# Patient Record
Sex: FEMALE | Race: WHITE | Employment: OTHER | URBAN - METROPOLITAN AREA
[De-identification: names, ages, dates, MRNs, and addresses within clinical notes are randomized per-mention and may not be internally consistent; named-entity substitution may affect disease eponyms.]

---

## 2020-09-16 ENCOUNTER — HOSPITAL ENCOUNTER (OUTPATIENT)
Dept: MAMMOGRAPHY | Age: 65
Discharge: HOME | End: 2020-09-16
Attending: GENERAL PRACTICE
Payer: COMMERCIAL

## 2020-09-16 DIAGNOSIS — Z12.31 VISIT FOR SCREENING MAMMOGRAM: ICD-10-CM

## 2020-09-16 PROCEDURE — 77063 BREAST TOMOSYNTHESIS BI: CPT

## 2021-01-19 ENCOUNTER — OFFICE VISIT (OUTPATIENT)
Dept: PRIMARY CARE CLINIC | Age: 66
End: 2021-01-19
Payer: COMMERCIAL

## 2021-01-19 VITALS
OXYGEN SATURATION: 100 % | HEIGHT: 68 IN | BODY MASS INDEX: 26.83 KG/M2 | WEIGHT: 177 LBS | SYSTOLIC BLOOD PRESSURE: 123 MMHG | RESPIRATION RATE: 16 BRPM | TEMPERATURE: 98.4 F | HEART RATE: 75 BPM | DIASTOLIC BLOOD PRESSURE: 85 MMHG

## 2021-01-19 DIAGNOSIS — Z11.59 NEED FOR HEPATITIS C SCREENING TEST: ICD-10-CM

## 2021-01-19 DIAGNOSIS — Z23 NEED FOR VACCINATION AGAINST STREPTOCOCCUS PNEUMONIAE USING PNEUMOCOCCAL CONJUGATE VACCINE 13: ICD-10-CM

## 2021-01-19 DIAGNOSIS — E03.9 ACQUIRED HYPOTHYROIDISM: Primary | ICD-10-CM

## 2021-01-19 DIAGNOSIS — N39.3 STRESS INCONTINENCE OF URINE: ICD-10-CM

## 2021-01-19 DIAGNOSIS — Z12.11 COLON CANCER SCREENING: ICD-10-CM

## 2021-01-19 DIAGNOSIS — F90.0 ATTENTION DEFICIT HYPERACTIVITY DISORDER (ADHD), PREDOMINANTLY INATTENTIVE TYPE: ICD-10-CM

## 2021-01-19 DIAGNOSIS — M17.11 PRIMARY OSTEOARTHRITIS OF RIGHT KNEE: ICD-10-CM

## 2021-01-19 DIAGNOSIS — K21.9 GASTROESOPHAGEAL REFLUX DISEASE WITHOUT ESOPHAGITIS: ICD-10-CM

## 2021-01-19 DIAGNOSIS — F31.9 BIPOLAR 1 DISORDER, DEPRESSED (HCC): ICD-10-CM

## 2021-01-19 DIAGNOSIS — E78.2 MIXED HYPERLIPIDEMIA: ICD-10-CM

## 2021-01-19 PROCEDURE — 99204 OFFICE O/P NEW MOD 45 MIN: CPT | Performed by: INTERNAL MEDICINE

## 2021-01-19 RX ORDER — DEXLANSOPRAZOLE 60 MG/1
60 CAPSULE, DELAYED RELEASE ORAL DAILY
COMMUNITY

## 2021-01-19 RX ORDER — LITHIUM CARBONATE 600 MG/1
CAPSULE ORAL
COMMUNITY
Start: 2020-07-10

## 2021-01-19 RX ORDER — BUPROPION HYDROCHLORIDE 300 MG/1
300 TABLET ORAL DAILY
COMMUNITY

## 2021-01-19 RX ORDER — B-COMPLEX WITH VITAMIN C
1 TABLET ORAL DAILY
COMMUNITY

## 2021-01-19 RX ORDER — LEVOTHYROXINE SODIUM 125 UG/1
125 TABLET ORAL DAILY
COMMUNITY
End: 2021-01-21 | Stop reason: SDUPTHER

## 2021-01-19 RX ORDER — ACETAMINOPHEN 500 MG
4000 TABLET ORAL DAILY
COMMUNITY

## 2021-01-19 RX ORDER — LIOTHYRONINE SODIUM 5 UG/1
5 TABLET ORAL DAILY
Qty: 90 TAB | Status: CANCELLED | OUTPATIENT
Start: 2021-01-19

## 2021-01-19 RX ORDER — LIOTHYRONINE SODIUM 5 UG/1
TABLET ORAL
COMMUNITY
Start: 2020-06-28 | End: 2021-01-21 | Stop reason: SDUPTHER

## 2021-01-19 RX ORDER — CETIRIZINE HCL 10 MG
10 TABLET ORAL DAILY
COMMUNITY

## 2021-01-19 RX ORDER — SOLIFENACIN SUCCINATE 10 MG/1
10 TABLET, FILM COATED ORAL DAILY
COMMUNITY

## 2021-01-19 RX ORDER — PROGESTERONE 100 MG/1
CAPSULE ORAL
COMMUNITY
Start: 2020-07-14

## 2021-01-19 RX ORDER — ATORVASTATIN CALCIUM 10 MG/1
10 TABLET, FILM COATED ORAL DAILY
COMMUNITY
End: 2021-01-21 | Stop reason: SDUPTHER

## 2021-01-19 NOTE — PROGRESS NOTES
Chief Complaint   Patient presents with   [address]     would like to have thyroid and lithium level and why is she so cold and she is fasting.

## 2021-01-19 NOTE — PROGRESS NOTES
Written by Rani Hall, as dictated by Dr. Domingo Soriano MD.    Juan Goncalves (: 1955) is a 60 y.o. female, new patient, here for evaluation of the following chief complaint(s):  Establish Care (would like to have thyroid and lithium level and why is she so cold and she is fasting.)     SUBJECTIVE/OBJECTIVE:  HPI  Pt presents today to establish care. She moved here from [place] in 2020 and has seen Dr. Anne Marie Dubose, but it was expensive to go there. She feels cold all the time and has had hypothyroidism for a long time. She currently takes liothyronine 5 mg and levothyroxine 125 mcg every day. She is taking progesterone for about a year but has not had any weight gain. She initially started it for thin, loose skin. She has ongoing depression which has been worsened by having a rough past year or so. She lost her  to cancer and moved back to [place] to be with her sisters after her loss. However, she has been having a lot of stress with her new house here having problems and needing a new roof. She would like to move back to [place]. She is currently taking Wellbutrin, Lithium, and Vyvanse for depression and follows with Dr. Veto Marti. She has bipolar disorder and notes that her father was an alcoholic. She also has ADHD and doing well on Vyvanse. She found on her last DEXA scan that she has osteopenia, but she has not been getting very much exercise. She does have an exercise bike at home she is trying to use more. She gets heartburn and takes Dexilant for it with good relief. She has intermittent constipation and diarrhea and does not currently take any medication for it. She hs s/p meniscus repair in her R knee and needs a replacement of both knees, but she is trying to hold off on this for the time being. She got her Shingrix vaccines, but she has not gotten her pneumonia vaccine. She got the flu vaccine on 10/2020.  Her last colonoscopy was in 2006, and she had her last Pap smear in  with Dr. Rachelle Sanchez. She got the Tdap vaccine in 2016. She has fhx of high cholesterol and high BP in her parents, and her sisters have depression like she does. Her mother  from a brain tumor. Patient Active Problem List   Diagnosis Code    Bipolar 1 disorder, depressed ([facility] [address] F31.9    Acquired hypothyroidism E03.9    Stress incontinence of urine N39.3        Current Outpatient Medications on File Prior to Visit   Medication Sig Dispense Refill    liothyronine (CYTOMEL) 5 mcg tablet       lithium carbonate 600 mg capsule       levothyroxine (SYNTHROID) 125 mcg tablet Take 125 mcg by mouth daily.  lisdexamfetamine (Vyvanse) 50 mg cap       buPROPion XL (WELLBUTRIN XL) 300 mg XL tablet Take 300 mg by mouth daily.  cholecalciferol (VITAMIN D3) (2,000 UNITS /50 MCG) cap capsule Take 4,000 Units by mouth daily.  dexlansoprazole (DEXILANT) 60 mg CpDB capsule (delayed release) Take 60 mg by mouth daily.  solifenacin (VESICARE) 10 mg tablet Take 10 mg by mouth daily.  atorvastatin (LIPITOR) 10 mg tablet Take 10 mg by mouth daily.  progesterone (PROMETRIUM) 100 mg capsule       cetirizine (ZYRTEC) 10 mg tablet Take 10 mg by mouth daily.  b-complex with vitamin c tablet Take 1 Tab by mouth daily. No current facility-administered medications on file prior to visit. Allergies   Allergen Reactions    Pcn [Penicillins] Rash     Raw rash       No past medical history on file. No past surgical history on file.     Family History   Problem Relation Age of Onset    Depression Mother     Depression Sister        Social History     Socioeconomic History    Marital status:      Spouse name: Not on file    Number of children: Not on file    Years of education: Not on file    Highest education level: Not on file   Occupational History    Not on file   Social Needs    Financial resource strain: Not on file   [employer] insecurity     Worry: Not on file     Inability: Not on file    Transportation needs     Medical: Not on file     Non-medical: Not on file   Tobacco Use    Smoking status: Not on file   Substance and Sexual Activity    Alcohol use: Not on file    Drug use: Not on file    Sexual activity: Not on file   Lifestyle    Physical activity     Days per week: Not on file     Minutes per session: Not on file    Stress: Not on file   Relationships    Social connections     Talks on phone: Not on file     Gets together: Not on file     Attends Advent service: Not on file     Active member of club or organization: Not on file     Attends meetings of clubs or organizations: Not on file     Relationship status: Not on file    Intimate partner violence     Fear of current or ex partner: Not on file     Emotionally abused: Not on file     Physically abused: Not on file     Forced sexual activity: Not on file   Other Topics Concern    Not on file   Social History Narrative    Not on file       No visits with results within 3 Month(s) from this visit. Latest known visit with results is:   No results found for any previous visit. Review of Systems   Constitutional: Negative for activity change, fatigue and unexpected weight change. HENT: Negative for congestion, hearing loss, rhinorrhea and sore throat. Eyes: Negative for discharge. Respiratory: Negative for cough, chest tightness and shortness of breath. Cardiovascular: Negative for leg swelling. Gastrointestinal: Positive for constipation (intermittent) and diarrhea (intermittent). Negative for abdominal pain. Endocrine: Positive for cold intolerance. Genitourinary: Negative for dysuria, flank pain, frequency and urgency. Musculoskeletal: Positive for arthralgias (R knee). Negative for back pain and myalgias. Skin: Negative for color change and rash. Neurological: Negative for dizziness, light-headedness and headaches. Psychiatric/Behavioral: Positive for dysphoric mood. Negative for sleep disturbance. The patient is not nervous/anxious. Visit Vitals  /85 (BP 1 Location: Left arm, BP Patient Position: Sitting)   Pulse 75   Temp 98.4 °F (36.9 °C) (Oral)   Resp 16   Ht 5' 8\" (1.727 m)   Wt 177 lb (80.3 kg)   SpO2 100%   BMI 26.91 kg/m²        Physical Exam  Vitals signs and nursing note reviewed. Constitutional:       General: She is not in acute distress. Appearance: Normal appearance. She is well-developed and normal weight. She is not diaphoretic. HENT:      Right Ear: External ear normal.      Left Ear: External ear normal.   Eyes:      General: No scleral icterus. Right eye: No discharge. Left eye: No discharge. Extraocular Movements: Extraocular movements intact. Conjunctiva/sclera: Conjunctivae normal.   Neck:      Musculoskeletal: Normal range of motion and neck supple. Cardiovascular:      Rate and Rhythm: Normal rate and regular rhythm. Pulmonary:      Effort: Pulmonary effort is normal.      Breath sounds: Normal breath sounds. No wheezing. Abdominal:      General: Bowel sounds are normal.      Palpations: Abdomen is soft. Tenderness: There is no abdominal tenderness. Lymphadenopathy:      Cervical: No cervical adenopathy. Neurological:      Mental Status: She is alert and oriented to person, place, and time. Psychiatric:         Mood and Affect: Mood and affect normal.         ASSESSMENT/PLAN:  1. Acquired hypothyroidism  -     TSH 3RD GENERATION; Future  -     T4, FREE; Future  She is currently taking Cytomel 5 mg and levothyroxine 125 mcg every day. Check TSH and T4 for medication management. 2. Gastroesophageal reflux disease without esophagitis  Stable, continues on Dexilant. 3. Stress incontinence of urine  Stable at this time.      4. Bipolar 1 disorder, depressed ([facility] [address]  -     LITHIUM; Future  She follows with Dr. Veto Marti and continues on Lithium every day. Check lithium levels. 5. Mixed hyperlipidemia  -     METABOLIC PANEL, COMPREHENSIVE; Future  -     CBC W/O DIFF; Future  -     LIPID PANEL; Future  Ordered fasting labs for pt to complete today in office. Waiting on results. 6. Need for hepatitis C screening test  -     HEPATITIS C AB; Future  Labs drawn in office today. 7. Colon cancer screening  -     OCCULT BLOOD IMMUNOASSAY,DIAGNOSTIC; Future  I ordered a stool sample for health maintenance. 8. Need for vaccination against Streptococcus pneumoniae using pneumococcal conjugate vaccine 13  -     pneumococcal 13 sherry conj dip (PREVNAR-13) 0.5 mL syrg injection; 0.5 mL by IntraMUSCular route once for 1 dose., Normal, Disp-0.5 mL, R-0 sent to pharmacy. Potential side effects were discussed. 9. Primary osteoarthritis of right knee  She follows with orthopedics and needs a R knee replacement at some point. 10. Attention deficit hyperactivity disorder (ADHD), predominantly inattentive type  Stable, continues on Vyvanse. This plan was reviewed with the patient and patient agrees. All questions were answered. This scribe documentation was reviewed by me and accurately reflects the examination and decisions made by me. An electronic signature was used to authenticate this note.   -- Ángel Guillaume

## 2021-01-21 ENCOUNTER — PATIENT MESSAGE (OUTPATIENT)
Dept: PRIMARY CARE CLINIC | Age: 66
End: 2021-01-21

## 2021-01-21 DIAGNOSIS — E78.2 MIXED HYPERLIPIDEMIA: ICD-10-CM

## 2021-01-21 DIAGNOSIS — E03.9 ACQUIRED HYPOTHYROIDISM: Primary | ICD-10-CM

## 2021-01-21 RX ORDER — LIOTHYRONINE SODIUM 5 UG/1
5 TABLET ORAL DAILY
Qty: 90 TAB | Refills: 0 | Status: SHIPPED | OUTPATIENT
Start: 2021-01-21 | End: 2021-04-15

## 2021-01-21 RX ORDER — ATORVASTATIN CALCIUM 10 MG/1
10 TABLET, FILM COATED ORAL DAILY
Qty: 90 TAB | Refills: 0 | Status: SHIPPED | OUTPATIENT
Start: 2021-01-21 | End: 2021-04-21

## 2021-01-21 RX ORDER — LEVOTHYROXINE SODIUM 125 UG/1
125 TABLET ORAL DAILY
Qty: 90 TAB | Refills: 0 | Status: SHIPPED | OUTPATIENT
Start: 2021-01-21 | End: 2021-04-06 | Stop reason: ALTCHOICE

## 2021-01-22 NOTE — TELEPHONE ENCOUNTER
Gavin Alas MD 1/21/2021 9:47 PM EST    Please fax her lab report to Dr. Euegnio Brandon office. She has provided the number. ----- Message -----  From: Dimitri Tai  Sent: 1/21/2021 12:15 PM EST  To: [facility]  Subject: Test Results Question     Hi Dr. Macy Regalado,    Will you please send Dr. Imani Rutherford the lithium and thyroid lab results. He is located at [place] [address], [address] - Phone [phone] Fax - [phone]. The atorvastatin 10 mg. pill is only taken on Monday, Wednesday and Friday. Is that ok considering the lab results? Is the synthroid 125 mcg and liothyronine 5 mcg sufficient? I am so cold - usually my hands. If so, I need a new 90 day prescription for the liothyronine sent to [facility] - [id number]      Thank you,  Dimitri Tai

## 2021-02-02 LAB — HEMOCCULT STL QL IA: NEGATIVE

## 2021-04-02 ENCOUNTER — VIRTUAL VISIT (OUTPATIENT)
Dept: PRIMARY CARE CLINIC | Age: 66
End: 2021-04-02
Payer: COMMERCIAL

## 2021-04-02 DIAGNOSIS — E03.9 ACQUIRED HYPOTHYROIDISM: ICD-10-CM

## 2021-04-02 DIAGNOSIS — L85.3 DRY SKIN DERMATITIS: Primary | ICD-10-CM

## 2021-04-02 DIAGNOSIS — K59.09 OTHER CONSTIPATION: ICD-10-CM

## 2021-04-02 PROCEDURE — 99213 OFFICE O/P EST LOW 20 MIN: CPT | Performed by: INTERNAL MEDICINE

## 2021-04-02 NOTE — PROGRESS NOTES
Written by Piedad Hamilton, as dictated by Dr. Lai Christy MD. Leora Horn (: 1955) is a 60 y.o. female, established patient, here for evaluation of the following chief complaint(s): 
Dry Skin ASSESSMENT/PLAN: 
1. Dry skin dermatitis I recommended she use Cetaphil soap and apply Eucerin lotion in a thick layer after her shower. 2. Acquired hypothyroidism 
-     TSH 3RD GENERATION; Future -     T4, FREE; Future Her sx could be the result of a thyroid imbalance, so I ordered labs for evaluation of her levels. 3. Other constipation Additional Comments: I instructed her to stop taking estrogen and progesterone since these No follow-ups on file. SUBJECTIVE/OBJECTIVE: 
HPI Pt presents virtually today to discuss dry skin. She has been noticing flakes of her skin on her clothes when she takes them off. She uses various lotions and has also tried exfoliating, but she still has the dry skin. She is currently taking levothyroxine 125 mcg, a half tablet on Monday and Friday and a full tablet the rest of the days of the week. She has constipation and goes 3 days without having a bowel movement, then has multiple in one day. She is not currently taking any OTC medications for this. She also taking Ctyomel 5 mcg every day. She got the [employer] COVID vaccine on 21. Patient Active Problem List  
Diagnosis Code  Bipolar 1 disorder, depressed ([facility] [address] F31.9  Acquired hypothyroidism E03.9  Stress incontinence of urine N39.3 Current Outpatient Medications on File Prior to Visit Medication Sig Dispense Refill  levothyroxine (SYNTHROID) 125 mcg tablet Take 1 Tab by mouth daily for 90 days. 90 Tab 0  
 liothyronine (CYTOMEL) 5 mcg tablet Take 1 Tab by mouth daily for 90 days. 90 Tab 0  
 atorvastatin (LIPITOR) 10 mg tablet Take 1 Tab by mouth daily for 90 days. 90 Tab 0  
 lithium carbonate 600 mg capsule  lisdexamfetamine (Vyvanse) 50 mg cap     
 buPROPion XL (WELLBUTRIN XL) 300 mg XL tablet Take 300 mg by mouth daily.  cholecalciferol (VITAMIN D3) (2,000 UNITS /50 MCG) cap capsule Take 4,000 Units by mouth daily.  dexlansoprazole (DEXILANT) 60 mg CpDB capsule (delayed release) Take 60 mg by mouth daily.  solifenacin (VESICARE) 10 mg tablet Take 10 mg by mouth daily.  progesterone (PROMETRIUM) 100 mg capsule  cetirizine (ZYRTEC) 10 mg tablet Take 10 mg by mouth daily.  b-complex with vitamin c tablet Take 1 Tab by mouth daily. No current facility-administered medications on file prior to visit. Allergies Allergen Reactions  Pcn [Penicillins] Rash Raw rash No past medical history on file. No past surgical history on file. Family History Problem Relation Age of Onset  Depression Mother  Depression Sister Social History Socioeconomic History  Marital status:  Spouse name: Not on file  Number of children: Not on file  Years of education: Not on file  Highest education level: Not on file Occupational History  Not on file Social Needs  Financial resource strain: Not on file  Food insecurity Worry: Not on file Inability: Not on file  Transportation needs Medical: Not on file Non-medical: Not on file Tobacco Use  Smoking status: Not on file Substance and Sexual Activity  Alcohol use: Not on file  Drug use: Not on file  Sexual activity: Not on file Lifestyle  Physical activity Days per week: Not on file Minutes per session: Not on file  Stress: Not on file Relationships  Social connections Talks on phone: Not on file Gets together: Not on file Attends Ten[employer] service: Not on file Active member of club or organization: Not on file Attends meetings of clubs or organizations: Not on file Relationship status: Not on file  Intimate partner violence   Fear of current or ex partner: Not on file Emotionally abused: Not on file Physically abused: Not on file Forced sexual activity: Not on file Other Topics Concern  Not on file Social History Narrative  Not on file Orders Only on 01/28/2021 Component Date Value Ref Range Status  Occult blood fecal, by [place] 01/28/2021 Negative  Negative Final  
Orders Only on 01/19/2021 Component Date Value Ref Range Status  Lithium level 01/20/2021 0.79  0.60 - 1.20 MMOL/L Final  
 Reported dose date 01/20/2021 NOT PROVIDED    Final  
 Reported dose time: 01/20/2021 NOT PROVIDED    Final  
 Reported dose: 01/20/2021 NOT PROVIDED  UNITS Final  
 Hep C virus Ab Interp. 01/19/2021 NONREACTIVE  NONREACTIVE   Final  
 Hep C  virus Ab comment 01/19/2021 Method used is [employer]    Final  
 T4, Free 01/19/2021 1.1  0.8 - 1.5 NG/DL Final  
 TSH 01/19/2021 0.25* 0.36 - 3.74 uIU/mL Final  
 Comment:  
Due to TSH heterogeneity, both structurally and degree of glycosylation, 
monoclonal antibodies used in the TSH assay may not accurately quantitate TSH. Therefore, this result should be correlated with clinical findings as well as 
with other assessments of thyroid function, e.g., free T4, free T3. 
  
 LIPID PROFILE 01/19/2021        Final  
 Cholesterol, total 01/19/2021 191  <200 MG/DL Final  
 Triglyceride 01/19/2021 194* <150 MG/DL Final  
 Comment: Based on NCEP-ATP III:  Triglycerides <150 mg/dL  is considered normal, 150-199 
mg/dL  borderline high,  200-499 mg/dL high and  greater than or equal to 500 
mg/dL very high.  HDL Cholesterol 01/19/2021 60  MG/DL Final  
 Comment: Based on NCEP ATP III, HDL Cholesterol <40 mg/dL is considered low and >60 
mg/dL is elevated.  
  
 LDL, calculated 01/19/2021 92.2  0 - 100 MG/DL Final  
 Comment: Based on the NCEP-ATP: LDL-C concentrations are considered  optimal <100 mg/dL, 
near optimal/above Normal 100-129 mg/dL Borderline High: 130-159, High: 160-189 
mg/dL Very High: Greater than or equal to 190 mg/dL  VLDL, calculated 01/19/2021 38.8  MG/DL Final  
 CHOL/HDL Ratio 01/19/2021 3.2  0.0 - 5.0   Final  
 WBC 01/19/2021 7.5  3.6 - 11.0 K/uL Final  
 RBC 01/19/2021 4.83  3.80 - 5.20 M/uL Final  
 HGB 01/19/2021 13.6  11.5 - 16.0 g/dL Final  
 HCT 01/19/2021 44.1  35.0 - 47.0 % Final  
 MCV 01/19/2021 91.3  80.0 - 99.0 FL Final  
 MCH 01/19/2021 28.2  26.0 - 34.0 PG Final  
 MCHC 01/19/2021 30.8  30.0 - 36.5 g/dL Final  
 RDW 01/19/2021 12.6  11.5 - 14.5 % Final  
 PLATELET 82/49/7521 370  150 - 400 K/uL Final  
 MPV 01/19/2021 10.1  8.9 - 12.9 FL Final  
 NRBC 01/19/2021 0.0  0  WBC Final  
 ABSOLUTE NRBC 01/19/2021 0.00  0.00 - 0.01 K/uL Final  
 Sodium 01/19/2021 138  136 - 145 mmol/L Final  
 Potassium 01/19/2021 5.0  3.5 - 5.1 mmol/L Final  
 Chloride 01/19/2021 106  97 - 108 mmol/L Final  
 CO2 01/19/2021 29  21 - 32 mmol/L Final  
 Anion gap 01/19/2021 3* 5 - 15 mmol/L Final  
 Glucose 01/19/2021 93  65 - 100 mg/dL Final  
 BUN 01/19/2021 15  6 - 20 MG/DL Final  
 Creatinine 01/19/2021 0.88  0.55 - 1.02 MG/DL Final  
 BUN/Creatinine ratio 01/19/2021 17  12 - 20   Final  
 GFR est AA 01/19/2021 >60  >60 ml/min/1.73m2 Final  
 GFR est non-AA 01/19/2021 >60  >60 ml/min/1.73m2 Final  
 Comment: Estimated GFR is calculated using the IDMS-traceable Modification of Diet in 
Renal Disease (MDRD) Study equation, reported for both  [facility]) and non- Americans (GFRNA), and normalized to 1.73m2 body 
surface area. The physician must decide which value applies to the patient.  Calcium 01/19/2021 10.0  8.5 - 10.1 MG/DL Final  
 Bilirubin, total 01/19/2021 0.5  0.2 - 1.0 MG/DL Final  
 ALT (SGPT) 01/19/2021 20  12 - 78 U/L Final  
 AST (SGOT) 01/19/2021 15  15 - 37 U/L Final  
 Alk.  phosphatase 01/19/2021 85  45 - 117 U/L Final  
 Protein, total 01/19/2021 7.1  6.4 - 8.2 g/dL Final  Albumin 01/19/2021 4.1  3.5 - 5.0 g/dL Final  
 Globulin 01/19/2021 3.0  2.0 - 4.0 g/dL Final  
 A-G Ratio 01/19/2021 1.4  1.1 - 2.2   Final  
 SAMPLES BEING HELD 01/19/2021 1 SST   Final  
 COMMENT 01/19/2021 Add-on orders for these samples will be processed based on acceptable specimen integrity and analyte stability, which may vary by analyte. Final  
 
Review of Systems There were no vitals taken for this visit. Physical Exam 
 
An electronic signature was used to authenticate this note. -- Andres Hui

## 2021-04-02 NOTE — PROGRESS NOTES
Nigel Mccullough (: 1955) is a 60 y.o. female, established patient, here for evaluation of the following chief complaint(s):   Dry Skin     Written by Nirmal Genao, as dictated by Dr. Collin Mario MD.    ASSESSMENT/PLAN:  1. Dry skin dermatitis  I recommended she use Cetaphil soap and apply Eucerin lotion in a thick layer after her shower. 2. Acquired hypothyroidism  -     TSH 3RD GENERATION; Future  -     T4, FREE; Future  Her sx could be the result of a thyroid imbalance, so I ordered labs for evaluation of her levels. 3. Other constipation  I recommended she take Metamucil every day, dissolved in a big glass of water. I am also checking her thyroid, which can cause constipation. Additional Comments: I instructed her to stop taking estrogen and progesterone since these are not helping her and have been expensive recently. SUBJECTIVE/OBJECTIVE:  HPI  Pt presents virtually today to discuss dry skin. She has been noticing flakes of her skin on her clothes when she takes them off. She uses various lotions and has also tried exfoliating, but she still has the dry skin. She is currently taking levothyroxine 125 mcg, a half tablet on Monday and Friday and a full tablet the rest of the days of the week. She has constipation and goes 3 days without having a bowel movement, then has multiple in one day. She is not currently taking any OTC medications for this. She also taking Ctyomel 5 mcg every day. She got the [employer] COVID vaccine on 21. Patient Active Problem List   Diagnosis Code    Bipolar 1 disorder, depressed ([facility] [address] F31.9    Acquired hypothyroidism E03.9    Stress incontinence of urine N39.3        Current Outpatient Medications on File Prior to Visit   Medication Sig Dispense Refill    levothyroxine (SYNTHROID) 125 mcg tablet Take 1 Tab by mouth daily for 90 days. 90 Tab 0    liothyronine (CYTOMEL) 5 mcg tablet Take 1 Tab by mouth daily for 90 days.  90 Tab 0  atorvastatin (LIPITOR) 10 mg tablet Take 1 Tab by mouth daily for 90 days. 90 Tab 0    lithium carbonate 600 mg capsule       lisdexamfetamine (Vyvanse) 50 mg cap       buPROPion XL (WELLBUTRIN XL) 300 mg XL tablet Take 300 mg by mouth daily.  cholecalciferol (VITAMIN D3) (2,000 UNITS /50 MCG) cap capsule Take 4,000 Units by mouth daily.  dexlansoprazole (DEXILANT) 60 mg CpDB capsule (delayed release) Take 60 mg by mouth daily.  solifenacin (VESICARE) 10 mg tablet Take 10 mg by mouth daily.  progesterone (PROMETRIUM) 100 mg capsule       cetirizine (ZYRTEC) 10 mg tablet Take 10 mg by mouth daily.  b-complex with vitamin c tablet Take 1 Tab by mouth daily. No current facility-administered medications on file prior to visit. Allergies   Allergen Reactions    Pcn [Penicillins] Rash     Raw rash       No past medical history on file. No past surgical history on file.     Family History   Problem Relation Age of Onset    Depression Mother     Depression Sister        Social History     Socioeconomic History    Marital status:      Spouse name: Not on file    Number of children: Not on file    Years of education: Not on file    Highest education level: Not on file   Occupational History    Not on file   Social Needs    Financial resource strain: Not on file    Food insecurity     Worry: Not on file     Inability: Not on file    Transportation needs     Medical: Not on file     Non-medical: Not on file   Tobacco Use    Smoking status: Not on file   Substance and Sexual Activity    Alcohol use: Not on file    Drug use: Not on file    Sexual activity: Not on file   Lifestyle    Physical activity     Days per week: Not on file     Minutes per session: Not on file    Stress: Not on file   Relationships    Social connections     Talks on phone: Not on file     Gets together: Not on file     Attends Spiritism service: Not on file     Active member of club or organization: Not on file     Attends meetings of clubs or organizations: Not on file     Relationship status: Not on file    Intimate partner violence     Fear of current or ex partner: Not on file     Emotionally abused: Not on file     Physically abused: Not on file     Forced sexual activity: Not on file   Other Topics Concern    Not on file   Social History Narrative    Not on file       Orders Only on 01/28/2021   Component Date Value Ref Range Status    Occult blood fecal, by IA 01/28/2021 Negative  Negative Final   Orders Only on 01/19/2021   Component Date Value Ref Range Status    Lithium level 01/20/2021 0.79  0.60 - 1.20 MMOL/L Final    Reported dose date 01/20/2021 NOT PROVIDED    Final    Reported dose time: 01/20/2021 NOT PROVIDED    Final    Reported dose: 01/20/2021 NOT PROVIDED  UNITS Final    Hep C virus Ab Interp. 01/19/2021 NONREACTIVE  NONREACTIVE   Final    Hep C  virus Ab comment 01/19/2021 Method used is Siemens Advia Centaur    Final    T4, Free 01/19/2021 1.1  0.8 - 1.5 NG/DL Final    TSH 01/19/2021 0.25* 0.36 - 3.74 uIU/mL Final    Comment:   Due to TSH heterogeneity, both structurally and degree of glycosylation,  monoclonal antibodies used in the TSH assay may not accurately quantitate TSH. Therefore, this result should be correlated with clinical findings as well as  with other assessments of thyroid function, e.g., free T4, free T3.      LIPID PROFILE 01/19/2021        Final    Cholesterol, total 01/19/2021 191  <200 MG/DL Final    Triglyceride 01/19/2021 194* <150 MG/DL Final    Comment: Based on NCEP-ATP III:  Triglycerides <150 mg/dL  is considered normal, 150-199  mg/dL  borderline high,  200-499 mg/dL high and  greater than or equal to 500  mg/dL very high.  HDL Cholesterol 01/19/2021 60  MG/DL Final    Comment: Based on NCEP ATP III, HDL Cholesterol <40 mg/dL is considered low and >60  mg/dL is elevated.       LDL, calculated 01/19/2021 92.2  0 - 100 MG/DL Final    Comment: Based on the NCEP-ATP: LDL-C concentrations are considered  optimal <100 mg/dL,  near optimal/above Normal 100-129 mg/dL Borderline High: 130-159, High: 160-189  mg/dL Very High: Greater than or equal to 190 mg/dL      VLDL, calculated 01/19/2021 38.8  MG/DL Final    CHOL/HDL Ratio 01/19/2021 3.2  0.0 - 5.0   Final    WBC 01/19/2021 7.5  3.6 - 11.0 K/uL Final    RBC 01/19/2021 4.83  3.80 - 5.20 M/uL Final    HGB 01/19/2021 13.6  11.5 - 16.0 g/dL Final    HCT 01/19/2021 44.1  35.0 - 47.0 % Final    MCV 01/19/2021 91.3  80.0 - 99.0 FL Final    MCH 01/19/2021 28.2  26.0 - 34.0 PG Final    MCHC 01/19/2021 30.8  30.0 - 36.5 g/dL Final    RDW 01/19/2021 12.6  11.5 - 14.5 % Final    PLATELET 77/62/4069 892  150 - 400 K/uL Final    MPV 01/19/2021 10.1  8.9 - 12.9 FL Final    NRBC 01/19/2021 0.0  0  WBC Final    ABSOLUTE NRBC 01/19/2021 0.00  0.00 - 0.01 K/uL Final    Sodium 01/19/2021 138  136 - 145 mmol/L Final    Potassium 01/19/2021 5.0  3.5 - 5.1 mmol/L Final    Chloride 01/19/2021 106  97 - 108 mmol/L Final    CO2 01/19/2021 29  21 - 32 mmol/L Final    Anion gap 01/19/2021 3* 5 - 15 mmol/L Final    Glucose 01/19/2021 93  65 - 100 mg/dL Final    BUN 01/19/2021 15  6 - 20 MG/DL Final    Creatinine 01/19/2021 0.88  0.55 - 1.02 MG/DL Final    BUN/Creatinine ratio 01/19/2021 17  12 - 20   Final    GFR est AA 01/19/2021 >60  >60 ml/min/1.73m2 Final    GFR est non-AA 01/19/2021 >60  >60 ml/min/1.73m2 Final    Comment: Estimated GFR is calculated using the IDMS-traceable Modification of Diet in  Renal Disease (MDRD) Study equation, reported for both  Americans  (GFRAA) and non- Americans (GFRNA), and normalized to 1.73m2 body  surface area. The physician must decide which value applies to the patient.       Calcium 01/19/2021 10.0  8.5 - 10.1 MG/DL Final    Bilirubin, total 01/19/2021 0.5  0.2 - 1.0 MG/DL Final    ALT (SGPT) 01/19/2021 20  12 - 78 U/L Final    AST (SGOT) 01/19/2021 15  15 - 37 U/L Final    Alk. phosphatase 01/19/2021 85  45 - 117 U/L Final    Protein, total 01/19/2021 7.1  6.4 - 8.2 g/dL Final    Albumin 01/19/2021 4.1  3.5 - 5.0 g/dL Final    Globulin 01/19/2021 3.0  2.0 - 4.0 g/dL Final    A-G Ratio 01/19/2021 1.4  1.1 - 2.2   Final    SAMPLES BEING HELD 01/19/2021 1 SST   Final    COMMENT 01/19/2021 Add-on orders for these samples will be processed based on acceptable specimen integrity and analyte stability, which may vary by analyte. Final     Review of Systems   Constitutional: Negative for activity change, fatigue and unexpected weight change. HENT: Negative for congestion, hearing loss, rhinorrhea and sore throat. Eyes: Negative for discharge. Respiratory: Negative for cough, chest tightness and shortness of breath. Cardiovascular: Negative for leg swelling. Gastrointestinal: Positive for constipation. Negative for abdominal pain and diarrhea. Genitourinary: Negative for dysuria, flank pain, frequency and urgency. Musculoskeletal: Negative for arthralgias, back pain and myalgias. Skin: Negative for color change and rash. +dry skin   Neurological: Negative for dizziness, light-headedness and headaches. Psychiatric/Behavioral: Negative for dysphoric mood and sleep disturbance. The patient is not nervous/anxious.          Patient-Reported Vitals 4/2/2021   Patient-Reported Weight 180   Patient-Reported Height 5'8\"   Patient-Reported Pulse 72   Patient-Reported Temperature 97.5       Physical Exam    [INSTRUCTIONS:  \"[x]\" Indicates a positive item  \"[]\" Indicates a negative item  -- DELETE ALL ITEMS NOT EXAMINED]    Constitutional: [x] Appears well-developed and well-nourished [x] No apparent distress      [] Abnormal -     Mental status: [x] Alert and awake  [x] Oriented to person/place/time [x] Able to follow commands    [] Abnormal -     Eyes:   EOM    [x]  Normal    [] Abnormal -   Sclera  [x]  Normal [] Abnormal -          Discharge [x]  None visible   [] Abnormal -     HENT: [x] Normocephalic, atraumatic  [] Abnormal -   [x] Mouth/Throat: Mucous membranes are moist    External Ears [x] Normal  [] Abnormal -    Neck: [x] No visualized mass [] Abnormal -     Pulmonary/Chest: [x] Respiratory effort normal   [x] No visualized signs of difficulty breathing or respiratory distress        [] Abnormal -      Musculoskeletal:   [x] Normal gait with no signs of ataxia         [x] Normal range of motion of neck        [] Abnormal -     Neurological:        [x] No Facial Asymmetry (Cranial nerve 7 motor function) (limited exam due to video visit)          [x] No gaze palsy        [] Abnormal -          Skin:        [x] No significant exanthematous lesions or discoloration noted on facial skin         [] Abnormal -            Psychiatric:       [x] Normal Affect [] Abnormal -        [x] No Hallucinations    Other pertinent observable physical exam findings:-    Susan Hare, was evaluated through a synchronous (real-time) audio-video encounter. The patient (or guardian if applicable) is aware that this is a billable service. Verbal consent to proceed has been obtained within the past 12 months. The visit was conducted pursuant to the emergency declaration under the [facility] [address] authority and the [employer] General Act. Patient identification was verified, and a caregiver was present when appropriate. The patient was located in a state where the provider was credentialed to provide care. An electronic signature was used to authenticate this note.   -- Verlean Goldberg

## 2021-04-03 LAB
T4 FREE SERPL-MCNC: 1.31 NG/DL (ref 0.82–1.77)
TSH SERPL DL<=0.005 MIU/L-ACNC: 0.42 UIU/ML (ref 0.45–4.5)

## 2021-04-06 DIAGNOSIS — E03.9 ACQUIRED HYPOTHYROIDISM: Primary | ICD-10-CM

## 2021-04-06 RX ORDER — LEVOTHYROXINE SODIUM 112 UG/1
112 TABLET ORAL
Qty: 90 TAB | Refills: 0 | Status: SHIPPED | OUTPATIENT
Start: 2021-04-06 | End: 2021-06-15

## 2021-04-15 DIAGNOSIS — E03.9 ACQUIRED HYPOTHYROIDISM: ICD-10-CM

## 2021-04-15 RX ORDER — LIOTHYRONINE SODIUM 5 UG/1
TABLET ORAL
Qty: 90 TAB | Refills: 0 | Status: SHIPPED | OUTPATIENT
Start: 2021-04-15 | End: 2021-07-22 | Stop reason: SDUPTHER

## 2021-05-10 DIAGNOSIS — M81.0 POSTMENOPAUSAL BONE LOSS: Primary | ICD-10-CM

## 2021-05-18 ENCOUNTER — HOSPITAL ENCOUNTER (OUTPATIENT)
Dept: MAMMOGRAPHY | Age: 66
Discharge: HOME | End: 2021-05-18
Attending: INTERNAL MEDICINE
Payer: MEDICARE

## 2021-05-18 DIAGNOSIS — M81.0 POSTMENOPAUSAL BONE LOSS: ICD-10-CM

## 2021-05-18 PROCEDURE — 77080 DXA BONE DENSITY AXIAL: CPT

## 2021-05-21 NOTE — PROGRESS NOTES
Results reviewed. Release via [place], [address] [facility],  you are doing well. .Your Bone density scan is back and showed osteopenia. I would suggest weight lifting exercises , vitamin D 1000 I.U daily dose and calcium 500 mg 3 times a week.

## 2021-05-26 ENCOUNTER — PATIENT MESSAGE (OUTPATIENT)
Dept: PRIMARY CARE CLINIC | Age: 66
End: 2021-05-26

## 2021-05-26 DIAGNOSIS — F31.9 BIPOLAR 1 DISORDER, DEPRESSED (HCC): Primary | ICD-10-CM

## 2021-05-26 DIAGNOSIS — R79.89 ELEVATED LITHIUM LEVEL: ICD-10-CM

## 2021-05-26 NOTE — TELEPHONE ENCOUNTER
From: Rachel Ivory  To: Jennifer Wren MD  Sent: 5/26/2021 9:41 AM EDT  Subject: Visit Follow-Up Question    Good Morning Dr. Bianca Michel,    I would like to obtain a new lithium level for Dr. Maryjane Rubinstein. I meet with him this coming Friday. May I stop into the lab for a test? The last one was in January.      Thank you,  Rachel Ivory

## 2021-06-13 DIAGNOSIS — E03.9 ACQUIRED HYPOTHYROIDISM: ICD-10-CM

## 2021-06-14 ENCOUNTER — PATIENT MESSAGE (OUTPATIENT)
Dept: PRIMARY CARE CLINIC | Age: 66
End: 2021-06-14

## 2021-06-14 DIAGNOSIS — E03.9 ACQUIRED HYPOTHYROIDISM: ICD-10-CM

## 2021-06-14 RX ORDER — LEVOTHYROXINE SODIUM 125 UG/1
TABLET ORAL
Qty: 90 TABLET | Refills: 0 | Status: SHIPPED | OUTPATIENT
Start: 2021-06-14 | End: 2021-06-15

## 2021-06-15 DIAGNOSIS — E03.9 ACQUIRED HYPOTHYROIDISM: ICD-10-CM

## 2021-06-15 RX ORDER — LEVOTHYROXINE SODIUM 125 UG/1
TABLET ORAL
Qty: 90 TABLET | Refills: 0 | Status: SHIPPED | OUTPATIENT
Start: 2021-06-15 | End: 2021-06-16 | Stop reason: SDUPTHER

## 2021-06-15 NOTE — TELEPHONE ENCOUNTER
From: Jazz Maguire  To: Percy Salazar MD  Sent: 6/14/2021 8:14 PM EDT  Subject: Prescription Question    Hi Dr. Tito Ames,    I am no longer using [facility] since my insurance is now Medicare and  for [employer] ([employer]). I guess [facility] sent in the refill request to you. Is it possible to have my Synthroid and any future prescriptions sent in to [facility] mail order ? Also, I am moving! I sold the house and am moving back to [facility] July 2nd. My grandson is 7 months old and is such a sima! I am still searching for a house, so will be staying with my daughter until I find something.      Thank you,  Jazz Maguire

## 2021-06-16 RX ORDER — LEVOTHYROXINE SODIUM 125 UG/1
TABLET ORAL
Qty: 90 TABLET | Refills: 0 | Status: SHIPPED | OUTPATIENT
Start: 2021-06-16

## 2021-07-22 DIAGNOSIS — E03.9 ACQUIRED HYPOTHYROIDISM: ICD-10-CM

## 2021-07-23 RX ORDER — LIOTHYRONINE SODIUM 5 UG/1
5 TABLET ORAL DAILY
Qty: 90 TABLET | Refills: 0 | Status: SHIPPED | OUTPATIENT
Start: 2021-07-23

## 2021-07-23 NOTE — TELEPHONE ENCOUNTER
Requested Prescriptions     Pending Prescriptions Disp Refills    liothyronine (CYTOMEL) 5 mcg tablet 90 Tablet 0        Last Visit 4/2/21   Last Refill 4/15/21

## 2022-03-19 PROBLEM — F31.9 BIPOLAR 1 DISORDER, DEPRESSED (HCC): Status: ACTIVE | Noted: 2021-01-19

## 2022-03-19 PROBLEM — E03.9 ACQUIRED HYPOTHYROIDISM: Status: ACTIVE | Noted: 2021-01-19

## 2022-03-19 PROBLEM — N39.3 STRESS INCONTINENCE OF URINE: Status: ACTIVE | Noted: 2021-01-19

## 2022-03-25 ENCOUNTER — NEW PATIENT (OUTPATIENT)
Dept: URBAN - METROPOLITAN AREA CLINIC 49 | Facility: CLINIC | Age: 67
End: 2022-03-25

## 2022-03-25 DIAGNOSIS — H35.342: ICD-10-CM

## 2022-03-25 PROCEDURE — 99204 OFFICE O/P NEW MOD 45 MIN: CPT

## 2022-03-25 PROCEDURE — 92201 OPSCPY EXTND RTA DRAW UNI/BI: CPT

## 2022-03-25 PROCEDURE — 92134 CPTRZ OPH DX IMG PST SGM RTA: CPT

## 2022-03-25 ASSESSMENT — TONOMETRY
OD_IOP_MMHG: 14
OS_IOP_MMHG: 17

## 2022-03-25 ASSESSMENT — VISUAL ACUITY
OS_CC: 20/80
OS_PH: 20/70+1
OD_PH: 20/25+2
OD_CC: 20/30

## 2023-05-12 RX ORDER — BUPROPION HYDROCHLORIDE 300 MG/1
300 TABLET ORAL DAILY
COMMUNITY

## 2023-05-12 RX ORDER — LIOTHYRONINE SODIUM 5 UG/1
1 TABLET ORAL DAILY
COMMUNITY
Start: 2021-07-23

## 2023-05-12 RX ORDER — CETIRIZINE HYDROCHLORIDE 10 MG/1
10 TABLET ORAL DAILY
COMMUNITY

## 2023-05-12 RX ORDER — PROGESTERONE 100 MG/1
CAPSULE ORAL
COMMUNITY
Start: 2020-07-14

## 2023-05-12 RX ORDER — LITHIUM CARBONATE 600 MG/1
CAPSULE ORAL
COMMUNITY
Start: 2020-07-10

## 2023-05-12 RX ORDER — DEXLANSOPRAZOLE 60 MG/1
60 CAPSULE, DELAYED RELEASE ORAL DAILY
COMMUNITY

## 2023-05-12 RX ORDER — LEVOTHYROXINE SODIUM 0.12 MG/1
1 TABLET ORAL DAILY
COMMUNITY
Start: 2021-06-16

## 2023-05-12 RX ORDER — SOLIFENACIN SUCCINATE 10 MG/1
10 TABLET, FILM COATED ORAL DAILY
COMMUNITY

## (undated) RX ORDER — OFLOXACIN 3 MG/ML: 1 SOLUTION OPHTHALMIC

## (undated) RX ORDER — PREDNISOLONE ACETATE 10 MG/ML: 1 SUSPENSION/ DROPS OPHTHALMIC